# Patient Record
Sex: FEMALE | Race: WHITE | NOT HISPANIC OR LATINO | Employment: OTHER | URBAN - METROPOLITAN AREA
[De-identification: names, ages, dates, MRNs, and addresses within clinical notes are randomized per-mention and may not be internally consistent; named-entity substitution may affect disease eponyms.]

---

## 2018-04-18 ENCOUNTER — OFFICE VISIT (OUTPATIENT)
Dept: OPHTHALMOLOGY | Facility: CLINIC | Age: 73
End: 2018-04-18
Payer: MEDICARE

## 2018-04-18 DIAGNOSIS — H52.7 REFRACTION DISORDER: ICD-10-CM

## 2018-04-18 DIAGNOSIS — H35.379 EPIRETINAL MEMBRANE, UNSPECIFIED LATERALITY: ICD-10-CM

## 2018-04-18 DIAGNOSIS — Z83.511 FAMILY HISTORY OF OPEN-ANGLE GLAUCOMA: ICD-10-CM

## 2018-04-18 DIAGNOSIS — H25.13 NUCLEAR SCLEROTIC CATARACT OF BOTH EYES: Primary | ICD-10-CM

## 2018-04-18 PROCEDURE — 99999 PR PBB SHADOW E&M-NEW PATIENT-LVL I: CPT | Mod: PBBFAC,,, | Performed by: OPHTHALMOLOGY

## 2018-04-18 PROCEDURE — 92004 COMPRE OPH EXAM NEW PT 1/>: CPT | Mod: S$GLB,,, | Performed by: OPHTHALMOLOGY

## 2018-04-18 PROCEDURE — 92015 DETERMINE REFRACTIVE STATE: CPT | Mod: S$GLB,,, | Performed by: OPHTHALMOLOGY

## 2018-04-18 RX ORDER — TRIAMCINOLONE ACETONIDE 55 UG/1
2 SPRAY, METERED NASAL DAILY
COMMUNITY

## 2018-04-18 NOTE — PROGRESS NOTES
HPI     Pt is here for 2nd opinion re: cataract surgery.  Has been planning to   have cataract surgery by Dr. Marion.  She told patient that she has a wrinkle   on the retina OD, and some pigment that might be a precursor to glaucoma.    Notes show pigment on k endothelium.  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  Most recently about a month   ago.    Last edited by Mando Napoles MD on 4/18/2018  9:23 AM. (History)            Assessment /Plan     For exam results, see Encounter Report.      ICD-10-CM ICD-9-CM    1. Nuclear sclerotic cataract of both eyes H25.13 366.16 Long discussion that cataracts do qualify or surgery and that she could proceed with the surgery if she desires. She also could fill the Rx for new glasses and wait. She will think it over and decide. Desires to have surgery at [facility].   2. Family history of open-angle glaucoma Z83.511 V19.11 Normal gOCT, IOP and stable optic nerve exam today.   Mild AMD only without true ERM on exam.    Return to clinic prn as above.

## 2018-05-09 ENCOUNTER — TELEPHONE (OUTPATIENT)
Dept: OPHTHALMOLOGY | Facility: CLINIC | Age: 73
End: 2018-05-09

## 2018-05-09 NOTE — TELEPHONE ENCOUNTER
----- Message from Stephanie Quezada sent at 5/9/2018  9:53 AM CDT -----  Contact: pt  [phone] would like to speak with nurse staff about before scheduling cataract she has questions

## 2018-05-09 NOTE — TELEPHONE ENCOUNTER
I spoke to the patient and she had some questions for Dr. Napoles about cataract surgery. Per Dr. Napoles, he does lens x, astigmatic keratotomy, and Toric iol implants. She was also wondering his success rate. Per Dr. Napoles, [place] surpass[place]

## 2018-09-12 ENCOUNTER — OFFICE VISIT (OUTPATIENT)
Dept: OPHTHALMOLOGY | Facility: CLINIC | Age: 73
End: 2018-09-12
Payer: MEDICARE

## 2018-09-12 DIAGNOSIS — Z83.511 FAMILY HISTORY OF OPEN-ANGLE GLAUCOMA: ICD-10-CM

## 2018-09-12 DIAGNOSIS — Z96.1 PSEUDOPHAKIA OF BOTH EYES: Primary | ICD-10-CM

## 2018-09-12 DIAGNOSIS — H52.7 REFRACTION DISORDER: ICD-10-CM

## 2018-09-12 PROCEDURE — 92015 DETERMINE REFRACTIVE STATE: CPT | Mod: ,,, | Performed by: OPHTHALMOLOGY

## 2018-09-12 PROCEDURE — 99211 OFF/OP EST MAY X REQ PHY/QHP: CPT | Mod: PBBFAC,PO | Performed by: OPHTHALMOLOGY

## 2018-09-12 PROCEDURE — 99999 PR PBB SHADOW E&M-EST. PATIENT-LVL I: CPT | Mod: PBBFAC,,, | Performed by: OPHTHALMOLOGY

## 2018-09-12 PROCEDURE — 92012 INTRM OPH EXAM EST PATIENT: CPT | Mod: S$PBB,,, | Performed by: OPHTHALMOLOGY

## 2018-09-12 RX ORDER — MECLIZINE HYDROCHLORIDE CHEWABLE TABLETS 25 MG/1
32 TABLET, CHEWABLE ORAL 3 TIMES DAILY PRN
COMMUNITY

## 2018-09-12 RX ORDER — SULFAMETHOXAZOLE AND TRIMETHOPRIM 400; 80 MG/1; MG/1
1 TABLET ORAL 2 TIMES DAILY
COMMUNITY
End: 2019-03-20

## 2018-09-12 RX ORDER — MELATONIN 3 MG
LOZENGE ORAL
COMMUNITY

## 2018-09-12 NOTE — PROGRESS NOTES
HPI     Patient returns after 4 month vision check , patient was last seen on   04/18/18 by [facility] and since then has had cataract surgery ou by Dr. Manriquez    patient states she has to wear readers all the  time and is not happy with   that , wants to switch to you from now on for her care, patient states she   needs a progressive rx, patient was dilated by Dr. Manriquez in May , just here   for glass rx.        PCIOL OD SN6AT3/+24.5/05/14/18/DR. MANRIQUEZ  PCIOL OS SN60WF +24.0 05/31/18/DR. MANRIQUEZ  ERM OD  Glaucoma suspect/Last HVF  01/17/2017  + FH Glaucoma- both parents   Pseudo Exf, Shane Trevino - Mom 97yo    Last edited by Mando Napoles MD on 9/12/2018  1:54 PM. (History)            Assessment /Plan     For exam results, see Encounter Report.      ICD-10-CM ICD-9-CM    1. Pseudophakia of both eyes Z96.1 V43.1 Stable    2. Family history of open-angle glaucoma Z83.511 V19.11 Glaucoma risk level is unchanged at this time and patient will continued to be followed.      3. Refraction disorder H52.7 367.9 MR shalonda.        Return to clinic 6 months with IOP check.  Will do HVF and dilation 6 months after next visit, will do yearly

## 2019-03-20 ENCOUNTER — OFFICE VISIT (OUTPATIENT)
Dept: OPHTHALMOLOGY | Facility: CLINIC | Age: 74
End: 2019-03-20
Payer: MEDICARE

## 2019-03-20 DIAGNOSIS — H52.7 REFRACTION DISORDER: ICD-10-CM

## 2019-03-20 DIAGNOSIS — H40.013 OPEN ANGLE WITH BORDERLINE FINDINGS OF BOTH EYES: Primary | ICD-10-CM

## 2019-03-20 DIAGNOSIS — Z83.511 FAMILY HISTORY OF OPEN-ANGLE GLAUCOMA: ICD-10-CM

## 2019-03-20 DIAGNOSIS — Z96.1 PSEUDOPHAKIA OF BOTH EYES: ICD-10-CM

## 2019-03-20 PROCEDURE — 92133 CPTRZD OPH DX IMG PST SGM ON: CPT | Mod: S$GLB,,, | Performed by: OPHTHALMOLOGY

## 2019-03-20 PROCEDURE — 99999 PR PBB SHADOW E&M-EST. PATIENT-LVL I: CPT | Mod: PBBFAC,,, | Performed by: OPHTHALMOLOGY

## 2019-03-20 PROCEDURE — 92012 PR EYE EXAM, EST PATIENT,INTERMED: ICD-10-PCS | Mod: S$GLB,,, | Performed by: OPHTHALMOLOGY

## 2019-03-20 PROCEDURE — 92133 POSTERIOR SEGMENT OCT OPTIC NERVE(OCULAR COHERENCE TOMOGRAPHY) - OU - BOTH EYES: ICD-10-PCS | Mod: S$GLB,,, | Performed by: OPHTHALMOLOGY

## 2019-03-20 PROCEDURE — 99999 PR PBB SHADOW E&M-EST. PATIENT-LVL I: ICD-10-PCS | Mod: PBBFAC,,, | Performed by: OPHTHALMOLOGY

## 2019-03-20 PROCEDURE — 92015 PR REFRACTION: ICD-10-PCS | Mod: S$GLB,,, | Performed by: OPHTHALMOLOGY

## 2019-03-20 PROCEDURE — 92015 DETERMINE REFRACTIVE STATE: CPT | Mod: S$GLB,,, | Performed by: OPHTHALMOLOGY

## 2019-03-20 PROCEDURE — 92012 INTRM OPH EXAM EST PATIENT: CPT | Mod: S$GLB,,, | Performed by: OPHTHALMOLOGY

## 2019-03-20 NOTE — PROGRESS NOTES
HPI     Glaucoma Suspect      Additional comments: 6 mth IOP check GOCT               Comments     Patient states she is very frustrated with having to keep track of her   readers patient would like to have an rx that she can wear for all   distances.    PCIOL OD SN6AT3/+24.5/05/14/18/DR. MANRIQUEZ  PCIOL OS SN60WF +24.0 05/31/18/DR. MANRIQUEZ  ERM OD  Glaucoma suspect/Last HVF 01/17/2017  + FH Glaucoma- both parents   Pseudo Exf, Mac Pucker - Mom 95yo          Last edited by SARAH Niño on 3/20/2019  3:38 PM. (History)            Assessment /Plan     For exam results, see Encounter Report.      ICD-10-CM ICD-9-CM    1. Open angle with borderline findings of both eyes H40.013 365.01 Posterior Segment OCT Optic Nerve- Both eyes    Glaucoma risk level is unchanged at this time and patient will continued to be followed.      2. Family history of open-angle glaucoma Z83.511 V19.11 above   3. Pseudophakia of both eyes Z96.1 V43.1 Stable    4. Refraction disorder H52.7 367.9 MR dispensed        Return to clinic 6 months with HVF, dilation, and sdp's

## 2019-09-24 ENCOUNTER — TELEPHONE (OUTPATIENT)
Dept: OPHTHALMOLOGY | Facility: CLINIC | Age: 74
End: 2019-09-24

## 2019-09-24 NOTE — TELEPHONE ENCOUNTER
----- Message from Keke Angeles sent at 9/24/2019  2:08 PM CDT -----  Contact: pt  Pt request call back to verify if eyed will be dilated for 9/25 appt ... [phone] (home)

## 2019-09-25 ENCOUNTER — OFFICE VISIT (OUTPATIENT)
Dept: OPHTHALMOLOGY | Facility: CLINIC | Age: 74
End: 2019-09-25
Payer: MEDICARE

## 2019-09-25 DIAGNOSIS — Z96.1 PSEUDOPHAKIA OF BOTH EYES: ICD-10-CM

## 2019-09-25 DIAGNOSIS — H40.013 OPEN ANGLE WITH BORDERLINE FINDINGS OF BOTH EYES: Primary | ICD-10-CM

## 2019-09-25 DIAGNOSIS — Z83.511 FAMILY HISTORY OF OPEN-ANGLE GLAUCOMA: ICD-10-CM

## 2019-09-25 PROCEDURE — 92083 HUMPHREY VISUAL FIELD - OU - BOTH EYES: ICD-10-PCS | Mod: S$GLB,,, | Performed by: OPHTHALMOLOGY

## 2019-09-25 PROCEDURE — 92014 COMPRE OPH EXAM EST PT 1/>: CPT | Mod: S$GLB,,, | Performed by: OPHTHALMOLOGY

## 2019-09-25 PROCEDURE — 92014 PR EYE EXAM, EST PATIENT,COMPREHESV: ICD-10-PCS | Mod: S$GLB,,, | Performed by: OPHTHALMOLOGY

## 2019-09-25 PROCEDURE — 92250 COLOR FUNDUS PHOTOGRAPHY - OU - BOTH EYES: ICD-10-PCS | Mod: S$GLB,,, | Performed by: OPHTHALMOLOGY

## 2019-09-25 PROCEDURE — 99999 PR PBB SHADOW E&M-EST. PATIENT-LVL I: CPT | Mod: PBBFAC,,, | Performed by: OPHTHALMOLOGY

## 2019-09-25 PROCEDURE — 92083 EXTENDED VISUAL FIELD XM: CPT | Mod: S$GLB,,, | Performed by: OPHTHALMOLOGY

## 2019-09-25 PROCEDURE — 92250 FUNDUS PHOTOGRAPHY W/I&R: CPT | Mod: S$GLB,,, | Performed by: OPHTHALMOLOGY

## 2019-09-25 PROCEDURE — 99999 PR PBB SHADOW E&M-EST. PATIENT-LVL I: ICD-10-PCS | Mod: PBBFAC,,, | Performed by: OPHTHALMOLOGY

## 2019-09-25 NOTE — PROGRESS NOTES
HPI     Glaucoma Suspect      Additional comments: 6 Month IOP Check with HVF, Dilation,and SDP              Comments     Patient feels she is having to rely on her glasses for small print,   patient does do a lot of reading so believes dryness could be a factor.      1. PCIOL OD SN6AT3/+24.5/05/14/18/DR. MANRIQUEZ  PCIOL OS SN60WF +24.0 05/31/18/DR. MANRIQUEZ  2. ERM OD  3. Glaucoma suspect/Last HVF 01/17/2017  + FH Glaucoma- both parents   4. Pseudo Exf, Mac Pucker - Mom 97yo          Last edited by Jane Bettencourt, Patient Care Assistant on 9/25/2019  3:17   PM. (History)            Assessment /Plan     For exam results, see Encounter Report.      ICD-10-CM ICD-9-CM    1. Open angle with borderline findings of both eyes H40.013 365.01 Color Fundus Photography - OU - Both Eyes      Lees Visual Field - OU - Extended - Both Eyes    Glaucoma risk level is unchanged at this time and patient will continued to be followed.      2. Family history of open-angle glaucoma Z83.511 V19.11 Above    3. Pseudophakia of both eyes Z96.1 V43.1 Stable        Return to clinic 6 months with IOP check and GOCT

## 2021-01-05 ENCOUNTER — PATIENT MESSAGE (OUTPATIENT)
Dept: ADMINISTRATIVE | Facility: OTHER | Age: 76
End: 2021-01-05

## 2021-04-29 ENCOUNTER — PATIENT MESSAGE (OUTPATIENT)
Dept: RESEARCH | Facility: HOSPITAL | Age: 76
End: 2021-04-29

## 2021-05-18 ENCOUNTER — OFFICE VISIT (OUTPATIENT)
Dept: OPHTHALMOLOGY | Facility: CLINIC | Age: 76
End: 2021-05-18
Payer: MEDICARE

## 2021-05-18 DIAGNOSIS — Z83.511 FAMILY HISTORY OF OPEN-ANGLE GLAUCOMA: ICD-10-CM

## 2021-05-18 DIAGNOSIS — Z96.1 PSEUDOPHAKIA OF BOTH EYES: ICD-10-CM

## 2021-05-18 DIAGNOSIS — H40.013 OPEN ANGLE WITH BORDERLINE FINDINGS OF BOTH EYES: Primary | ICD-10-CM

## 2021-05-18 PROCEDURE — 99213 OFFICE O/P EST LOW 20 MIN: CPT | Mod: S$GLB,,, | Performed by: OPHTHALMOLOGY

## 2021-05-18 PROCEDURE — 92133 CPTRZD OPH DX IMG PST SGM ON: CPT | Mod: S$GLB,,, | Performed by: OPHTHALMOLOGY

## 2021-05-18 PROCEDURE — 1159F MED LIST DOCD IN RCRD: CPT | Mod: S$GLB,,, | Performed by: OPHTHALMOLOGY

## 2021-05-18 PROCEDURE — 99999 PR PBB SHADOW E&M-EST. PATIENT-LVL III: ICD-10-PCS | Mod: PBBFAC,,, | Performed by: OPHTHALMOLOGY

## 2021-05-18 PROCEDURE — 99213 PR OFFICE/OUTPT VISIT, EST, LEVL III, 20-29 MIN: ICD-10-PCS | Mod: S$GLB,,, | Performed by: OPHTHALMOLOGY

## 2021-05-18 PROCEDURE — 99999 PR PBB SHADOW E&M-EST. PATIENT-LVL III: CPT | Mod: PBBFAC,,, | Performed by: OPHTHALMOLOGY

## 2021-05-18 PROCEDURE — 92083 EXTENDED VISUAL FIELD XM: CPT | Mod: S$GLB,,, | Performed by: OPHTHALMOLOGY

## 2021-05-18 PROCEDURE — 92133 POSTERIOR SEGMENT OCT OPTIC NERVE(OCULAR COHERENCE TOMOGRAPHY) - OU - BOTH EYES: ICD-10-PCS | Mod: S$GLB,,, | Performed by: OPHTHALMOLOGY

## 2021-05-18 PROCEDURE — 1159F PR MEDICATION LIST DOCUMENTED IN MEDICAL RECORD: ICD-10-PCS | Mod: S$GLB,,, | Performed by: OPHTHALMOLOGY

## 2021-05-18 PROCEDURE — 92083 HUMPHREY VISUAL FIELD - OU - BOTH EYES: ICD-10-PCS | Mod: S$GLB,,, | Performed by: OPHTHALMOLOGY

## 2021-05-18 RX ORDER — RALOXIFENE HYDROCHLORIDE 60 MG/1
5 TABLET, FILM COATED ORAL
COMMUNITY
Start: 2021-05-05

## 2022-06-29 ENCOUNTER — OFFICE VISIT (OUTPATIENT)
Dept: OPHTHALMOLOGY | Facility: CLINIC | Age: 77
End: 2022-06-29
Payer: MEDICARE

## 2022-06-29 DIAGNOSIS — H26.493 PCO (POSTERIOR CAPSULAR OPACIFICATION), BILATERAL: ICD-10-CM

## 2022-06-29 DIAGNOSIS — H40.013 OPEN ANGLE WITH BORDERLINE FINDINGS OF BOTH EYES: Primary | ICD-10-CM

## 2022-06-29 DIAGNOSIS — Z83.511 FAMILY HISTORY OF OPEN-ANGLE GLAUCOMA: ICD-10-CM

## 2022-06-29 DIAGNOSIS — Z96.1 PSEUDOPHAKIA OF BOTH EYES: ICD-10-CM

## 2022-06-29 DIAGNOSIS — H52.7 REFRACTIVE ERROR: ICD-10-CM

## 2022-06-29 PROCEDURE — 99999 PR PBB SHADOW E&M-EST. PATIENT-LVL III: CPT | Mod: PBBFAC,,, | Performed by: OPHTHALMOLOGY

## 2022-06-29 PROCEDURE — 99999 PR PBB SHADOW E&M-EST. PATIENT-LVL III: ICD-10-PCS | Mod: PBBFAC,,, | Performed by: OPHTHALMOLOGY

## 2022-06-29 PROCEDURE — 92083 HUMPHREY VISUAL FIELD - OU - BOTH EYES: ICD-10-PCS | Mod: S$GLB,,, | Performed by: OPHTHALMOLOGY

## 2022-06-29 PROCEDURE — 1159F MED LIST DOCD IN RCRD: CPT | Mod: CPTII,S$GLB,, | Performed by: OPHTHALMOLOGY

## 2022-06-29 PROCEDURE — 92083 EXTENDED VISUAL FIELD XM: CPT | Mod: S$GLB,,, | Performed by: OPHTHALMOLOGY

## 2022-06-29 PROCEDURE — 1159F PR MEDICATION LIST DOCUMENTED IN MEDICAL RECORD: ICD-10-PCS | Mod: CPTII,S$GLB,, | Performed by: OPHTHALMOLOGY

## 2022-06-29 PROCEDURE — 92015 PR REFRACTION: ICD-10-PCS | Mod: S$GLB,,, | Performed by: OPHTHALMOLOGY

## 2022-06-29 PROCEDURE — 1160F RVW MEDS BY RX/DR IN RCRD: CPT | Mod: CPTII,S$GLB,, | Performed by: OPHTHALMOLOGY

## 2022-06-29 PROCEDURE — 92133 CPTRZD OPH DX IMG PST SGM ON: CPT | Mod: S$GLB,,, | Performed by: OPHTHALMOLOGY

## 2022-06-29 PROCEDURE — 99214 OFFICE O/P EST MOD 30 MIN: CPT | Mod: S$GLB,,, | Performed by: OPHTHALMOLOGY

## 2022-06-29 PROCEDURE — 1160F PR REVIEW ALL MEDS BY PRESCRIBER/CLIN PHARMACIST DOCUMENTED: ICD-10-PCS | Mod: CPTII,S$GLB,, | Performed by: OPHTHALMOLOGY

## 2022-06-29 PROCEDURE — 99214 PR OFFICE/OUTPT VISIT, EST, LEVL IV, 30-39 MIN: ICD-10-PCS | Mod: S$GLB,,, | Performed by: OPHTHALMOLOGY

## 2022-06-29 PROCEDURE — 92015 DETERMINE REFRACTIVE STATE: CPT | Mod: S$GLB,,, | Performed by: OPHTHALMOLOGY

## 2022-06-29 PROCEDURE — 92133 POSTERIOR SEGMENT OCT OPTIC NERVE(OCULAR COHERENCE TOMOGRAPHY) - OU - BOTH EYES: ICD-10-PCS | Mod: S$GLB,,, | Performed by: OPHTHALMOLOGY

## 2022-06-29 RX ORDER — ZOLEDRONIC ACID 5 MG/100ML
5 INJECTION, SOLUTION INTRAVENOUS ONCE
COMMUNITY

## 2022-06-29 NOTE — PROGRESS NOTES
HPI     Annual Exam     Comments: Pt reports for yearly exam with HVF, GOCT. Denies any pain or   irritation. Va stable.               Comments     1. PCIOL OD SN6AT3/+24.5/05/14/18/DR. MANRIQUEZ   PCIOL OS SN60WF +24.0 05/31/18/DR. MANRIQUEZ   2. ERM OD   3. Glaucoma suspect/Last HVF 01/17/2017   + FH Glaucoma- both parents   4. Pseudo Exf, Mac Pucker - Mom 97yo      CCT: 552/560  GCL: 29/29            Last edited by Mando Funes on 6/29/2022  3:04 PM. (History)            Assessment /Plan     For exam results, see Encounter Report.      ICD-10-CM ICD-9-CM    1. Open angle with borderline findings of both eyes  H40.013 365.01 Posterior Segment OCT Optic Nerve- Both eyes- performed today, normal findings OU      Lees Visual Field - OU - Extended - Both Eyes- performed today, OD: normal findings, OS: Mild inf loss    Glaucoma risk level is unchanged at this time and patient will continued to be followed.    2. Family history of open-angle glaucoma  Z83.511 V19.11 As above.   3. Pseudophakia of both eyes  Z96.1 V43.1 Stable, monitor   4. 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 Yag laser treatment as needed if visual loss occurs.    5.  Refractive error       Disp MRx today, monitor        Return to clinic 1 year with HVF 24-2